# Patient Record
Sex: FEMALE | Race: WHITE | NOT HISPANIC OR LATINO | URBAN - METROPOLITAN AREA
[De-identification: names, ages, dates, MRNs, and addresses within clinical notes are randomized per-mention and may not be internally consistent; named-entity substitution may affect disease eponyms.]

---

## 2017-07-30 ENCOUNTER — INPATIENT (INPATIENT)
Facility: HOSPITAL | Age: 52
LOS: 0 days | Discharge: HOME HEALTH | DRG: 908 | End: 2017-07-31
Attending: SURGERY | Admitting: SURGERY
Payer: COMMERCIAL

## 2017-07-30 VITALS
TEMPERATURE: 99 F | HEART RATE: 98 BPM | OXYGEN SATURATION: 98 % | DIASTOLIC BLOOD PRESSURE: 90 MMHG | SYSTOLIC BLOOD PRESSURE: 148 MMHG | WEIGHT: 122.8 LBS | RESPIRATION RATE: 18 BRPM

## 2017-07-30 DIAGNOSIS — Z98.82 BREAST IMPLANT STATUS: Chronic | ICD-10-CM

## 2017-07-30 DIAGNOSIS — Z90.721 ACQUIRED ABSENCE OF OVARIES, UNILATERAL: Chronic | ICD-10-CM

## 2017-07-30 DIAGNOSIS — T85.49XA OTHER MECHANICAL COMPLICATION OF BREAST PROSTHESIS AND IMPLANT, INITIAL ENCOUNTER: ICD-10-CM

## 2017-07-30 DIAGNOSIS — E03.9 HYPOTHYROIDISM, UNSPECIFIED: ICD-10-CM

## 2017-07-30 LAB
ALBUMIN SERPL ELPH-MCNC: 4.1 G/DL — SIGNIFICANT CHANGE UP (ref 3.3–5)
ALP SERPL-CCNC: 47 U/L — SIGNIFICANT CHANGE UP (ref 40–120)
ALT FLD-CCNC: 10 U/L — SIGNIFICANT CHANGE UP (ref 10–45)
ANION GAP SERPL CALC-SCNC: 12 MMOL/L — SIGNIFICANT CHANGE UP (ref 5–17)
ANION GAP SERPL CALC-SCNC: 13 MMOL/L — SIGNIFICANT CHANGE UP (ref 5–17)
APTT BLD: 26 SEC — LOW (ref 27.5–37.4)
AST SERPL-CCNC: 16 U/L — SIGNIFICANT CHANGE UP (ref 10–40)
BASOPHILS NFR BLD AUTO: 0.2 % — SIGNIFICANT CHANGE UP (ref 0–2)
BASOPHILS NFR BLD AUTO: 0.2 % — SIGNIFICANT CHANGE UP (ref 0–2)
BILIRUB SERPL-MCNC: 0.5 MG/DL — SIGNIFICANT CHANGE UP (ref 0.2–1.2)
BLD GP AB SCN SERPL QL: NEGATIVE — SIGNIFICANT CHANGE UP
BUN SERPL-MCNC: 16 MG/DL — SIGNIFICANT CHANGE UP (ref 7–23)
BUN SERPL-MCNC: 16 MG/DL — SIGNIFICANT CHANGE UP (ref 7–23)
CALCIUM SERPL-MCNC: 9.2 MG/DL — SIGNIFICANT CHANGE UP (ref 8.4–10.5)
CALCIUM SERPL-MCNC: 9.3 MG/DL — SIGNIFICANT CHANGE UP (ref 8.4–10.5)
CHLORIDE SERPL-SCNC: 104 MMOL/L — SIGNIFICANT CHANGE UP (ref 96–108)
CHLORIDE SERPL-SCNC: 105 MMOL/L — SIGNIFICANT CHANGE UP (ref 96–108)
CO2 SERPL-SCNC: 22 MMOL/L — SIGNIFICANT CHANGE UP (ref 22–31)
CO2 SERPL-SCNC: 23 MMOL/L — SIGNIFICANT CHANGE UP (ref 22–31)
CREAT SERPL-MCNC: 0.7 MG/DL — SIGNIFICANT CHANGE UP (ref 0.5–1.3)
CREAT SERPL-MCNC: 0.8 MG/DL — SIGNIFICANT CHANGE UP (ref 0.5–1.3)
EOSINOPHIL NFR BLD AUTO: 0.4 % — SIGNIFICANT CHANGE UP (ref 0–6)
EOSINOPHIL NFR BLD AUTO: 0.5 % — SIGNIFICANT CHANGE UP (ref 0–6)
GLUCOSE SERPL-MCNC: 86 MG/DL — SIGNIFICANT CHANGE UP (ref 70–99)
GLUCOSE SERPL-MCNC: 87 MG/DL — SIGNIFICANT CHANGE UP (ref 70–99)
HCG UR QL: NEGATIVE — SIGNIFICANT CHANGE UP
HCT VFR BLD CALC: 39.8 % — SIGNIFICANT CHANGE UP (ref 34.5–45)
HCT VFR BLD CALC: 40.2 % — SIGNIFICANT CHANGE UP (ref 34.5–45)
HGB BLD-MCNC: 12.9 G/DL — SIGNIFICANT CHANGE UP (ref 11.5–15.5)
HGB BLD-MCNC: 13.1 G/DL — SIGNIFICANT CHANGE UP (ref 11.5–15.5)
INR BLD: 1.07 — SIGNIFICANT CHANGE UP (ref 0.88–1.16)
LYMPHOCYTES # BLD AUTO: 11.9 % — LOW (ref 13–44)
LYMPHOCYTES # BLD AUTO: 14.5 % — SIGNIFICANT CHANGE UP (ref 13–44)
MCHC RBC-ENTMCNC: 32.3 PG — SIGNIFICANT CHANGE UP (ref 27–34)
MCHC RBC-ENTMCNC: 32.3 PG — SIGNIFICANT CHANGE UP (ref 27–34)
MCHC RBC-ENTMCNC: 32.4 G/DL — SIGNIFICANT CHANGE UP (ref 32–36)
MCHC RBC-ENTMCNC: 32.6 G/DL — SIGNIFICANT CHANGE UP (ref 32–36)
MCV RBC AUTO: 99.3 FL — SIGNIFICANT CHANGE UP (ref 80–100)
MCV RBC AUTO: 99.7 FL — SIGNIFICANT CHANGE UP (ref 80–100)
MONOCYTES NFR BLD AUTO: 7.5 % — SIGNIFICANT CHANGE UP (ref 2–14)
MONOCYTES NFR BLD AUTO: 7.7 % — SIGNIFICANT CHANGE UP (ref 2–14)
NEUTROPHILS NFR BLD AUTO: 77.1 % — HIGH (ref 43–77)
NEUTROPHILS NFR BLD AUTO: 80 % — HIGH (ref 43–77)
PLATELET # BLD AUTO: 328 K/UL — SIGNIFICANT CHANGE UP (ref 150–400)
PLATELET # BLD AUTO: 341 K/UL — SIGNIFICANT CHANGE UP (ref 150–400)
POTASSIUM SERPL-MCNC: 3.7 MMOL/L — SIGNIFICANT CHANGE UP (ref 3.5–5.3)
POTASSIUM SERPL-MCNC: 4 MMOL/L — SIGNIFICANT CHANGE UP (ref 3.5–5.3)
POTASSIUM SERPL-SCNC: 3.7 MMOL/L — SIGNIFICANT CHANGE UP (ref 3.5–5.3)
POTASSIUM SERPL-SCNC: 4 MMOL/L — SIGNIFICANT CHANGE UP (ref 3.5–5.3)
PROT SERPL-MCNC: 7.4 G/DL — SIGNIFICANT CHANGE UP (ref 6–8.3)
PROTHROM AB SERPL-ACNC: 11.9 SEC — SIGNIFICANT CHANGE UP (ref 9.8–12.7)
RBC # BLD: 3.99 M/UL — SIGNIFICANT CHANGE UP (ref 3.8–5.2)
RBC # BLD: 4.05 M/UL — SIGNIFICANT CHANGE UP (ref 3.8–5.2)
RBC # FLD: 12.4 % — SIGNIFICANT CHANGE UP (ref 10.3–16.9)
RBC # FLD: 12.4 % — SIGNIFICANT CHANGE UP (ref 10.3–16.9)
RH IG SCN BLD-IMP: POSITIVE — SIGNIFICANT CHANGE UP
SODIUM SERPL-SCNC: 139 MMOL/L — SIGNIFICANT CHANGE UP (ref 135–145)
SODIUM SERPL-SCNC: 140 MMOL/L — SIGNIFICANT CHANGE UP (ref 135–145)
TSH SERPL-MCNC: 0.96 UIU/ML — SIGNIFICANT CHANGE UP (ref 0.35–4.94)
WBC # BLD: 11.1 K/UL — HIGH (ref 3.8–10.5)
WBC # BLD: 11.6 K/UL — HIGH (ref 3.8–10.5)
WBC # FLD AUTO: 11.1 K/UL — HIGH (ref 3.8–10.5)
WBC # FLD AUTO: 11.6 K/UL — HIGH (ref 3.8–10.5)

## 2017-07-30 PROCEDURE — 99285 EMERGENCY DEPT VISIT HI MDM: CPT | Mod: 25

## 2017-07-30 PROCEDURE — 93010 ELECTROCARDIOGRAM REPORT: CPT | Mod: 59

## 2017-07-30 PROCEDURE — 71020: CPT | Mod: 26

## 2017-07-30 RX ORDER — KETOROLAC TROMETHAMINE 30 MG/ML
30 SYRINGE (ML) INJECTION ONCE
Qty: 0 | Refills: 0 | Status: DISCONTINUED | OUTPATIENT
Start: 2017-07-30 | End: 2017-07-31

## 2017-07-30 RX ORDER — PIPERACILLIN AND TAZOBACTAM 4; .5 G/20ML; G/20ML
3.38 INJECTION, POWDER, LYOPHILIZED, FOR SOLUTION INTRAVENOUS ONCE
Qty: 0 | Refills: 0 | Status: DISCONTINUED | OUTPATIENT
Start: 2017-07-30 | End: 2017-07-30

## 2017-07-30 RX ORDER — LEVOTHYROXINE SODIUM 125 MCG
100 TABLET ORAL DAILY
Qty: 0 | Refills: 0 | Status: DISCONTINUED | OUTPATIENT
Start: 2017-07-30 | End: 2017-07-31

## 2017-07-30 RX ORDER — LEVOTHYROXINE SODIUM 125 MCG
1 TABLET ORAL
Qty: 0 | Refills: 0 | COMMUNITY

## 2017-07-30 RX ORDER — PIPERACILLIN AND TAZOBACTAM 4; .5 G/20ML; G/20ML
3.38 INJECTION, POWDER, LYOPHILIZED, FOR SOLUTION INTRAVENOUS EVERY 6 HOURS
Qty: 0 | Refills: 0 | Status: DISCONTINUED | OUTPATIENT
Start: 2017-07-30 | End: 2017-07-31

## 2017-07-30 RX ORDER — DEXTROSE MONOHYDRATE, SODIUM CHLORIDE, AND POTASSIUM CHLORIDE 50; .745; 4.5 G/1000ML; G/1000ML; G/1000ML
1000 INJECTION, SOLUTION INTRAVENOUS
Qty: 0 | Refills: 0 | Status: DISCONTINUED | OUTPATIENT
Start: 2017-07-30 | End: 2017-07-31

## 2017-07-30 RX ORDER — ZOLPIDEM TARTRATE 10 MG/1
5 TABLET ORAL AT BEDTIME
Qty: 0 | Refills: 0 | Status: DISCONTINUED | OUTPATIENT
Start: 2017-07-30 | End: 2017-07-31

## 2017-07-30 RX ORDER — VANCOMYCIN HCL 1 G
VIAL (EA) INTRAVENOUS
Qty: 0 | Refills: 0 | Status: DISCONTINUED | OUTPATIENT
Start: 2017-07-30 | End: 2017-07-30

## 2017-07-30 RX ORDER — ACETAMINOPHEN 500 MG
650 TABLET ORAL EVERY 6 HOURS
Qty: 0 | Refills: 0 | Status: DISCONTINUED | OUTPATIENT
Start: 2017-07-30 | End: 2017-07-31

## 2017-07-30 RX ORDER — VANCOMYCIN HCL 1 G
1000 VIAL (EA) INTRAVENOUS EVERY 12 HOURS
Qty: 0 | Refills: 0 | Status: DISCONTINUED | OUTPATIENT
Start: 2017-07-30 | End: 2017-07-31

## 2017-07-30 RX ADMIN — Medication 250 MILLIGRAM(S): at 19:03

## 2017-07-30 RX ADMIN — DEXTROSE MONOHYDRATE, SODIUM CHLORIDE, AND POTASSIUM CHLORIDE 100 MILLILITER(S): 50; .745; 4.5 INJECTION, SOLUTION INTRAVENOUS at 19:37

## 2017-07-30 RX ADMIN — Medication 100 MILLIGRAM(S): at 16:06

## 2017-07-30 RX ADMIN — PIPERACILLIN AND TAZOBACTAM 200 GRAM(S): 4; .5 INJECTION, POWDER, LYOPHILIZED, FOR SOLUTION INTRAVENOUS at 18:16

## 2017-07-30 NOTE — H&P ADULT - HISTORY OF PRESENT ILLNESS
HPI: Adela Santoro is a 50 yo F hx hypothyroidism who is 25 year s/p bilateral breast augmentation with a combination silicone and saline double lumen breast implant who presented to the [facility] ED today with an exposed infected right breast implant. She has a known history of right breast capsular contracture since September of 2016. She has seen 2 surgeons, Dr. Manuel Diehl in [place] and Dr. Stefano Zapata in [place], [place]. She splits her time between the two cities for work. She reports that since september, the right breast has been gradually eroding through her skin, and she has surgery scheduled with Dr. Zapata on November 10th for removal of bilateral prepectoral implants and placement of new subpectoral implants with alloderm reinforcement. Three days ago, she developed a sore in the right breast which turned black and has begun draining pus today. She denies fever, chills, nausea, emesis, chest pain, or any other symptoms. Her last meal was around 1:00pm today.

## 2017-07-30 NOTE — ED ADULT NURSE NOTE - OBJECTIVE STATEMENT
pt c/o redness and whole on right breast oozing puss. Patient endorsed that right breast implants are 25 year old and had scheduled right breast implant change in November due to Implant is eroded skin tissue. right breast at mid six oclock has black whole oozing puss.

## 2017-07-30 NOTE — H&P ADULT - NSHPLABSRESULTS_GEN_ALL_CORE
Vital Signs Last 24 Hrs  T(C): 36.6 (30 Jul 2017 17:21), Max: 37.2 (30 Jul 2017 14:17)  T(F): 97.8 (30 Jul 2017 17:21), Max: 99 (30 Jul 2017 14:17)  HR: 78 (30 Jul 2017 17:21) (78 - 98)  BP: 130/86 (30 Jul 2017 17:21) (130/86 - 148/90)  BP(mean): --  ABP: --  ABP(mean): --  RR: 17 (30 Jul 2017 17:21) (17 - 18)  SpO2: 100% (30 Jul 2017 17:21) (98% - 100%)    CBC Full  -  ( 30 Jul 2017 18:30 )  WBC Count : 11.6 K/uL  Hemoglobin : 12.9 g/dL  Hematocrit : 39.8 %  Platelet Count - Automated : 328 K/uL  Mean Cell Volume : 99.7 fL  Mean Cell Hemoglobin : 32.3 pg  Mean Cell Hemoglobin Concentration : 32.4 g/dL  Auto Neutrophil # : x  Auto Lymphocyte # : x  Auto Monocyte # : x  Auto Eosinophil # : x  Auto Basophil # : x  Auto Neutrophil % : 80.0 %  Auto Lymphocyte % : 11.9 %  Auto Monocyte % : 7.5 %  Auto Eosinophil % : 0.4 %  Auto Basophil % : 0.2 %  07-30    140  |  105  |  16  ----------------------------<  87  4.0   |  23  |  0.70    Ca    9.3      30 Jul 2017 18:30    TPro  7.4  /  Alb  4.1  /  TBili  0.5  /  DBili  x   /  AST  16  /  ALT  10  /  AlkPhos  47  07-30  PT/INR - ( 30 Jul 2017 15:53 )   PT: 11.9 sec;   INR: 1.07          PTT - ( 30 Jul 2017 15:53 )  PTT:26.0 sec

## 2017-07-30 NOTE — ED ADULT TRIAGE NOTE - CHIEF COMPLAINT QUOTE
"I have a wound on my right breast and pus is coming out. I had an implant 25years ago and I'm planning to have a replacement in November."

## 2017-07-30 NOTE — H&P ADULT - PROBLEM SELECTOR PLAN 2
-Pt will be admitted to the inpatient plastic surgery service under Dr. Manuel Orr  -She will be added onto the OR schedule for today for explantatinon of bilateral implants and washout of right breast  -Cultures will be sent from the operating room and will be followed up on  -Will begin IV vancomycin (trough before the 3rd dose) and zosyn  -Will follow-up preop EKG, CXR, type and screen, CBC, BMP, coags  -Ms. Santoro will remain inpatient after surgery today for IV antibiotics and for culture data to guide antibiotics choice.  -Pt will be NPO with IV fluids while awaiting the OR.

## 2017-07-30 NOTE — H&P ADULT - NSHPPHYSICALEXAM_GEN_ALL_CORE
Physical Exam:  Gen: pt is alert and oriented in no apparent distress  Breast: Pt has a 3mm diameter wound in the lower inner quadrant of the breast with purulent drainage. Her implant is exposed at the base of the wound. There is minimal surrounding erythema, minimal tenderness. No malodor. She has grade 3 capsular contracture bilaterally.   CV: RRR, S1 and S2 wnl, no murmur, rub or gallop  Pulm: CTA B/L, no wheezes, crackles or rales  Ext: extremities warm and well perfused with 2+ pulses.

## 2017-07-30 NOTE — H&P ADULT - NSHPSOCIALHISTORY_GEN_ALL_CORE
Social: pt is  with no children. 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 She does not smoke or use drugs, and she drinks socially 1-2 times per week.

## 2017-07-30 NOTE — ED PROVIDER NOTE - MEDICAL DECISION MAKING DETAILS
capsular contracture  right breast implant with abscess  cellulitis and skin breakdown  will need removal I/D in OR

## 2017-07-30 NOTE — H&P ADULT - ASSESSMENT
Adela Santoro is a 50 yo F hx hypothyroidism who is 25 year s/p bilateral breast augmentation with a combination silicone and saline double lumen breast implant who presented to the [facility] ED today with an exposed infected right breast implant. She has a known history of right breast capsular contracture since September of 2016, and since then she reports her right breast implant has been gradually eroding through her skin. Three days ago, she developed a sore in the right breast which turned black and has begun draining purulent materal today.

## 2017-07-30 NOTE — ED PROVIDER NOTE - OBJECTIVE STATEMENT
52 yo female with erythema induration and drainage from right inf aspect of breast implant x 3 days-  induration erythema and necrosis - had implants x 25 years  dx with capsular contraction several months ago- was scheduled to have them out in November--pt denies any trauma to breast--no fevers or chills

## 2017-07-31 VITALS
DIASTOLIC BLOOD PRESSURE: 78 MMHG | RESPIRATION RATE: 17 BRPM | SYSTOLIC BLOOD PRESSURE: 112 MMHG | OXYGEN SATURATION: 100 % | HEART RATE: 75 BPM | TEMPERATURE: 97 F

## 2017-07-31 RX ORDER — ACETAMINOPHEN 500 MG
2 TABLET ORAL
Qty: 0 | Refills: 0 | COMMUNITY
Start: 2017-07-31

## 2017-07-31 RX ADMIN — PIPERACILLIN AND TAZOBACTAM 200 GRAM(S): 4; .5 INJECTION, POWDER, LYOPHILIZED, FOR SOLUTION INTRAVENOUS at 00:00

## 2017-07-31 RX ADMIN — Medication 650 MILLIGRAM(S): at 07:11

## 2017-07-31 RX ADMIN — Medication 100 MICROGRAM(S): at 05:48

## 2017-07-31 RX ADMIN — Medication 650 MILLIGRAM(S): at 06:11

## 2017-07-31 RX ADMIN — Medication 250 MILLIGRAM(S): at 06:30

## 2017-07-31 RX ADMIN — PIPERACILLIN AND TAZOBACTAM 200 GRAM(S): 4; .5 INJECTION, POWDER, LYOPHILIZED, FOR SOLUTION INTRAVENOUS at 06:00

## 2017-07-31 NOTE — PROGRESS NOTE ADULT - SUBJECTIVE AND OBJECTIVE BOX
SUBJECTIVE:  50 yo F POD #1 s/p removal of bilateral silicone breast implants for exposed/infected/ruptured right implant and subtotal capsulectomy on left for calcified anterior capsule. Pt doing well this am, no fever, no shortness of breath, no concerns. She had no overnight events and she is tolerating IV vancomycin and zosyn well. She is very eager for discharge early this AM due to work responsibilities.     OBJECTIVE:     ** VITAL SIGNS / I&O's **    T(C): 36.9 (07-31-17 @ 04:59), Max: 37.2 (07-30-17 @ 14:17)  T(F): 98.5 (07-31-17 @ 04:59), Max: 99 (07-30-17 @ 14:17)  HR: 63 (07-31-17 @ 04:59) (63 - 98)  BP: 116/78 (07-31-17 @ 04:59) (114/69 - 148/90)  RR: 17 (07-31-17 @ 04:59) (14 - 20)  SpO2: 99% (07-31-17 @ 04:59) (98% - 100%)      30 Jul 2017 07:01  -  31 Jul 2017 07:00  --------------------------------------------------------  IN:    dextrose 5% + sodium chloride 0.9% with potassium chloride 20 mEq/L: 750 mL    Solution: 100 mL  Total IN: 850 mL    OUT:    Voided: 800 mL  Total OUT: 800 mL    Total NET: 50 mL          ** PHYSICAL EXAM **    -- CONSTITUTIONAL: AOx3. NAD. Accompanied by  at bedside.  -- BREASTS: Cellulitis resolved, no tenderness. Right breast wound clean with no necrotic tissue, no erythema, judy well. Bilateral breast JPs are serosanguinous. Bilateral IMF incisions are C/D/I.  -- CARDIOVASCULAR: Regular rate and rhythm. S1, S2 wnl.  -- RESPIRATORY: Bilateral breath sounds CTA bilaterally. No wheezes, crackles, or rales.  -- EXTREMITIES: no edema, 2+ pulses intact.     ** LABS **                        12.9   11.6  )-----------( 328      ( 30 Jul 2017 18:30 )             39.8     30 Jul 2017 18:30    140    |  105    |  16     ----------------------------<  87     4.0     |  23     |  0.70     Ca    9.3        30 Jul 2017 18:30    TPro  7.4    /  Alb  4.1    /  TBili  0.5    /  DBili  x      /  AST  16     /  ALT  10     /  AlkPhos  47     30 Jul 2017 15:53    PT/INR - ( 30 Jul 2017 15:53 )   PT: 11.9 sec;   INR: 1.07          PTT - ( 30 Jul 2017 15:53 )  PTT:26.0 sec    ** CULTURES **  Pending

## 2017-07-31 NOTE — DISCHARGE NOTE ADULT - PLAN OF CARE
post op recovery, follow up *Please refer to the post-operative care instructions provided from Dr. Orr’s office.     -Follow up with Plastic Surgeon, Dr. Orr tomorrow in the office.     -Take tylenol as needed for pain control  -Finish the course of antibiotic, Augmentin, as prescribed.    -Diet: no restrictions.     -Sponge bath only. Keep the incision site clean and dry at all times.      -No heavy lifting >20 pounds or strenuous exercises.    -Call Doctor’s office or return to ER if: fever (temperature >101.4F), chills, chest pain, shortness of breath, uncontrolled/severe pain, persistent nausea/vomiting, or bleeding/oozing/redness/swelling at incision sites.

## 2017-07-31 NOTE — DISCHARGE NOTE ADULT - MEDICATION SUMMARY - MEDICATIONS TO TAKE
I will START or STAY ON the medications listed below when I get home from the hospital:    acetaminophen 325 mg oral tablet  -- 2 tab(s) by mouth every 6 hours, As needed, Moderate Pain (4 - 6)  -- Indication: For pain    Augmentin 875 mg-125 mg oral tablet  -- 1 tab(s) by mouth every 12 hours  -- Finish all this medication unless otherwise directed by prescriber.  Take with food or milk.    -- Indication: For infection    Synthroid 100 mcg (0.1 mg) oral tablet  -- 1 tab(s) by mouth once a day  -- Indication: For Hypotension

## 2017-07-31 NOTE — PROGRESS NOTE ADULT - ASSESSMENT
52 yo F POD #1 s/p removal of bilateral silicone breast implants for exposed/infected/ruptured right implant and subtotal capsulectomy on left for calcified anterior capsule. Pt is very eager for discharge and was opposed to the idea of admission altogether. I advised her that I would like to keep her inpatient until her culture data returns so we can start her on the appropriate antibiotics. She refuses to remain inpatient beyond this morning. I was able to convince her to stay for the entire day today in order to have as many doses of IV antibiotics as possible. She is amenable to this and she has agreed to come to my office tomorrow for follow-up so we can look at her culture data.  -D/c tonight on PO augmentin  -Follow-up cultures  -ACE compression over breasts  -SHAJI drain teaching

## 2017-07-31 NOTE — DISCHARGE NOTE ADULT - CARE PLAN
Principal Discharge DX:	Abscess of breast  Goal:	post op recovery, follow up  Instructions for follow-up, activity and diet:	*Please refer to the post-operative care instructions provided from Dr. Orr’s office.     -Follow up with Plastic Surgeon, Dr. Orr tomorrow in the office.     -Take tylenol as needed for pain control  -Finish the course of antibiotic, Augmentin, as prescribed.    -Diet: no restrictions.     -Sponge bath only. Keep the incision site clean and dry at all times.      -No heavy lifting >20 pounds or strenuous exercises.    -Call Doctor’s office or return to ER if: fever (temperature >101.4F), chills, chest pain, shortness of breath, uncontrolled/severe pain, persistent nausea/vomiting, or bleeding/oozing/redness/swelling at incision sites.

## 2017-07-31 NOTE — DISCHARGE NOTE ADULT - HOSPITAL COURSE
50 yo F admitted with exposed, infected right breast implant s/p removal of bilateral breast implants. Patient tolerated the procedure well, was started on IV antibiotics, and had uneventful postoperative hospital course.  On the day of the discharge, patient was evaluated and deemed in stable condition to be discharged to home with PO antibiotics. Follow up tomorrow with Dr. Orr  in the office.

## 2017-07-31 NOTE — DISCHARGE NOTE ADULT - CARE PROVIDER_API CALL
Manuel Orr), Plastic Surgery  [address] [place] [address]   Phone: [phone]  Fax: [phone]

## 2017-07-31 NOTE — PROGRESS NOTE ADULT - SUBJECTIVE AND OBJECTIVE BOX
SUBJECTIVE:  Doing well.   No overnight events.     OBJECTIVE:     ** VITAL SIGNS / I&O's **    T(C): 36.9 (07-31-17 @ 04:59), Max: 37.2 (07-30-17 @ 14:17)  T(F): 98.5 (07-31-17 @ 04:59), Max: 99 (07-30-17 @ 14:17)  HR: 63 (07-31-17 @ 04:59) (63 - 98)  BP: 116/78 (07-31-17 @ 04:59) (114/69 - 148/90)  RR: 17 (07-31-17 @ 04:59) (14 - 20)  SpO2: 99% (07-31-17 @ 04:59) (98% - 100%)      30 Jul 2017 07:01  -  31 Jul 2017 07:00  --------------------------------------------------------  IN:    dextrose 5% + sodium chloride 0.9% with potassium chloride 20 mEq/L: 750 mL    Solution: 100 mL  Total IN: 850 mL    OUT:    Voided: 800 mL  Total OUT: 800 mL    Total NET: 50 mL          ** PHYSICAL EXAM **    -- CONSTITUTIONAL: AOx3. NAD.   -- BREASTS: No collections. Incision intact. JPs serosanguinous.  -- CARDIOVASCULAR: Regular rate and rhythm. S1, S2.  -- RESPIRATORY: Bilateral breath sounds.     ** LABS **                          12.9   11.6  )-----------( 328      ( 30 Jul 2017 18:30 )             39.8     30 Jul 2017 18:30    140    |  105    |  16     ----------------------------<  87     4.0     |  23     |  0.70     Ca    9.3        30 Jul 2017 18:30    TPro  7.4    /  Alb  4.1    /  TBili  0.5    /  DBili  x      /  AST  16     /  ALT  10     /  AlkPhos  47     30 Jul 2017 15:53    PT/INR - ( 30 Jul 2017 15:53 )   PT: 11.9 sec;   INR: 1.07          PTT - ( 30 Jul 2017 15:53 )  PTT:26.0 sec  CAPILLARY BLOOD GLUCOSE

## 2017-07-31 NOTE — DISCHARGE NOTE ADULT - PATIENT PORTAL LINK FT
“You can access the [employer] Patient Portal, offered by [facility], by registering with the following website: [email or web address]”

## 2017-08-01 LAB
GRAM STN FLD: SIGNIFICANT CHANGE UP
GRAM STN FLD: SIGNIFICANT CHANGE UP
NIGHT BLUE STAIN TISS: SIGNIFICANT CHANGE UP
SPECIMEN SOURCE: SIGNIFICANT CHANGE UP

## 2017-08-01 PROCEDURE — 87070 CULTURE OTHR SPECIMN AEROBIC: CPT

## 2017-08-01 PROCEDURE — 88311 DECALCIFY TISSUE: CPT

## 2017-08-01 PROCEDURE — 81025 URINE PREGNANCY TEST: CPT

## 2017-08-01 PROCEDURE — 85730 THROMBOPLASTIN TIME PARTIAL: CPT

## 2017-08-01 PROCEDURE — 80048 BASIC METABOLIC PNL TOTAL CA: CPT

## 2017-08-01 PROCEDURE — 87106 FUNGI IDENTIFICATION YEAST: CPT

## 2017-08-01 PROCEDURE — 88302 TISSUE EXAM BY PATHOLOGIST: CPT

## 2017-08-01 PROCEDURE — 86850 RBC ANTIBODY SCREEN: CPT

## 2017-08-01 PROCEDURE — 84443 ASSAY THYROID STIM HORMONE: CPT

## 2017-08-01 PROCEDURE — 85025 COMPLETE CBC W/AUTO DIFF WBC: CPT

## 2017-08-01 PROCEDURE — 93005 ELECTROCARDIOGRAM TRACING: CPT

## 2017-08-01 PROCEDURE — 86900 BLOOD TYPING SEROLOGIC ABO: CPT

## 2017-08-01 PROCEDURE — 71046 X-RAY EXAM CHEST 2 VIEWS: CPT

## 2017-08-01 PROCEDURE — 87116 MYCOBACTERIA CULTURE: CPT

## 2017-08-01 PROCEDURE — 86901 BLOOD TYPING SEROLOGIC RH(D): CPT

## 2017-08-01 PROCEDURE — 99285 EMERGENCY DEPT VISIT HI MDM: CPT | Mod: 25

## 2017-08-01 PROCEDURE — 80053 COMPREHEN METABOLIC PANEL: CPT

## 2017-08-01 PROCEDURE — 88300 SURGICAL PATH GROSS: CPT

## 2017-08-01 PROCEDURE — 87040 BLOOD CULTURE FOR BACTERIA: CPT

## 2017-08-01 PROCEDURE — 88305 TISSUE EXAM BY PATHOLOGIST: CPT

## 2017-08-01 PROCEDURE — 87075 CULTR BACTERIA EXCEPT BLOOD: CPT

## 2017-08-01 PROCEDURE — 36415 COLL VENOUS BLD VENIPUNCTURE: CPT

## 2017-08-01 PROCEDURE — 96374 THER/PROPH/DIAG INJ IV PUSH: CPT

## 2017-08-01 PROCEDURE — 87206 SMEAR FLUORESCENT/ACID STAI: CPT

## 2017-08-01 PROCEDURE — 85610 PROTHROMBIN TIME: CPT

## 2017-08-02 LAB
CULTURE RESULTS: SIGNIFICANT CHANGE UP
SPECIMEN SOURCE: SIGNIFICANT CHANGE UP

## 2017-08-03 DIAGNOSIS — T85.49XA OTHER MECHANICAL COMPLICATION OF BREAST PROSTHESIS AND IMPLANT, INITIAL ENCOUNTER: ICD-10-CM

## 2017-08-03 DIAGNOSIS — E03.9 HYPOTHYROIDISM, UNSPECIFIED: ICD-10-CM

## 2017-08-03 DIAGNOSIS — F90.9 ATTENTION-DEFICIT HYPERACTIVITY DISORDER, UNSPECIFIED TYPE: ICD-10-CM

## 2017-08-03 LAB
CULTURE RESULTS: NO GROWTH — SIGNIFICANT CHANGE UP
SPECIMEN SOURCE: SIGNIFICANT CHANGE UP

## 2017-08-05 LAB
CULTURE RESULTS: SIGNIFICANT CHANGE UP
CULTURE RESULTS: SIGNIFICANT CHANGE UP
SPECIMEN SOURCE: SIGNIFICANT CHANGE UP
SPECIMEN SOURCE: SIGNIFICANT CHANGE UP

## 2017-08-09 DIAGNOSIS — T85.79XA INFECTION AND INFLAMMATORY REACTION DUE TO OTHER INTERNAL PROSTHETIC DEVICES, IMPLANTS AND GRAFTS, INITIAL ENCOUNTER: ICD-10-CM

## 2017-08-09 DIAGNOSIS — T85.44XA CAPSULAR CONTRACTURE OF BREAST IMPLANT, INITIAL ENCOUNTER: ICD-10-CM
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
## 2017-09-20 LAB
CULTURE RESULTS: SIGNIFICANT CHANGE UP
SPECIMEN SOURCE: SIGNIFICANT CHANGE UP

## 2017-10-11 LAB
CULTURE RESULTS: SIGNIFICANT CHANGE UP
SPECIMEN SOURCE: SIGNIFICANT CHANGE UP

## 2022-07-29 NOTE — PATIENT PROFILE ADULT. - NSALCOHOLLASTUSE_GEN_A_CORE_DT
Problem: Adult Inpatient Plan of Care  Goal: Plan of Care Review  Outcome: Ongoing, Progressing  Goal: Patient-Specific Goal (Individualized)  Outcome: Ongoing, Progressing  Goal: Absence of Hospital-Acquired Illness or Injury  Outcome: Ongoing, Progressing  Goal: Optimal Comfort and Wellbeing  Outcome: Ongoing, Progressing  Goal: Readiness for Transition of Care  Outcome: Ongoing, Progressing      30-Jul-2017
